# Patient Record
Sex: MALE | Race: WHITE
[De-identification: names, ages, dates, MRNs, and addresses within clinical notes are randomized per-mention and may not be internally consistent; named-entity substitution may affect disease eponyms.]

---

## 2017-01-09 NOTE — OPR
DATE OF OPERATION:  01/09/2017

 

 

PREOPERATIVE DIAGNOSIS:  Recurrent right inguinal hernia.

 

POSTOPERATIVE DIAGNOSIS:  Recurrent right inguinal hernia.

 

OPERATION PERFORMED:  Repair of recurrent right inguinal hernia.

 

ANESTHESIA:  General.

 

ANESTHESIOLOGIST:  Zeus Cornelius MD

 

SURGEON:  Steven Iqbal MD 

 

INDICATIONS FOR PROCEDURE:  The patient is a 71-year-old male who I had previously done a right ingu
inal herniorrhaphy on with mesh.  He did quite well for several months, but was lifting heavy object
s and experiencing an acute onset of "tear" and immediate bulge in his right groin.  He presented fo
r surgical evaluation was counseled as to the need for repair of this recurrent hernia, he consented
 and was scheduled for surgery.

 

DESCRIPTION OF PROCEDURE:  The patient was brought to the operating theater, placed under general an
esthesia.  The right groin was shaved, prepped and draped in the usual sterile fashion.  There is a 
visually obvious recurrent hernia just inferior to the previous surgical incisional scar.  Surgical 
incisional scar was reincised with a 15-blade scalpel.  Subcutaneous tissue was dissected with caute
ry.  Deep in the subcutaneous space, a well-circumscribed hernia sac was identified.  It was dissect
ed down to its base with the fascia.  It was opened up and found to contain omentum.  The sac and a 
portion of the omentum were transected with the LigaSure device and sent for permanent pathologic an
alysis.  Inspection of the defect revealed there was approximately 1.5 to 2 cm in diameter.  The dec
ision was made to repair it with a mesh plug.  The mesh plug was cut to size and placed within the d
efect in the fascia of the abdominal wall medially and to the inguinal ligament laterally were then 
used to perform a secure repair of the defect.  This was accomplished with 0 Prolene sutures in inte
rrupted fashion, taking great care to incorporate portions of the mesh plug within the suture repair
.  The patient tolerated the procedure well.  The wound was irrigated.  Minimal bleeding was control
led with cautery, and the skin was then reapproximated with skin staples.

 

ESTIMATED BLOOD LOSS:  20 mL.

 

COMPLICATIONS:  There were no complications.

 

DISPOSITION:  The patient was transported in stable condition to the recovery room.

 

 

Dictated By: STEVEN IQBAL MD

 

TL/TOY

DD:    01/09/2017 13:13:40

DT:    01/09/2017 14:40:09

Conf#: [id number]

DID#:  [id number]

## 2017-01-09 NOTE — RADRPT
PROCEDURE:   Chest Radiograph.

 

CLINICAL INDICATION:    Preop.  Hernia repair.

 

TECHNIQUE:   Single frontal chest radiograph. 

 

COMPARISON:  None available

 

FINDINGS:

 

Heart size is within normal limits.  Atherosclerotic calcifications are present.  There is mild biap
ical scarring.   No infiltrate or effusion is seen.    The bones are intact.  

 

IMPRESSION:

 

1.  No evidence of acute cardiopulmonary disease.    

 

RPTAT: KK

_____________________________________________ 

.Yury Johnson MD, MD           Date    Time 

Electronically viewed and signed by .Yury Johnson MD, MD on 01/09/2017 10:47 

 

D:  01/09/2017 10:47  T:  01/09/2017 10:47

.B/

## 2017-01-11 NOTE — RADRPT
Vent Rate: 76 bpm

RR Interval: 0 msec

MI Interval: 132 msec

QRS Duration: 96 msec

QT Interval: 388 msec

QTC Interval: 436 msec

P-R-T Axis: 80 - 51 - 67 degrees

 

 Normal sinus rhythm

 Normal ECG

 

Electronically Signed By: Gabriel Dinh

[id number]

## 2017-06-05 NOTE — RADRPT
PROCEDURE:   CHEST 1VW

 

CLINICAL INDICATION:  Preoperative

 

TECHNIQUE:   Single frontal view of the chest was obtained

 

COMPARISON:   01/19/2017 

 

FINDINGS:

 

The cardiac size is normal.

Aortic vascular calcifications are demonstrated.  

There is no pulmonary vascular congestion.

The lungs are clear. No consolidation, effusion, or pneumothorax. 

Mild degenerative changes of the visualized osseous structures are visualized.

 

IMPRESSION:

 

1. No acute cardiopulmonary process.

2. Atherosclerosis. 

 

RPTAT:PP

_____________________________________________ 

.Renan Shankar MD, MD           Date    Time 

Electronically viewed and signed by .Renan Shankar MD, MD on 06/05/2017 13:30 

 

D:  06/05/2017 13:30  T:  06/05/2017 13:30

.V/

## 2017-06-05 NOTE — OPR
DATE OF OPERATION:  06/05/2017

 

 

PREOPERATIVE DIAGNOSIS:  Symptomatic left inguinal hernia.

 

POSTOPERATIVE DIAGNOSIS:  Symptomatic left inguinal hernia.

 

OPERATION PERFORMED:  Left inguinal herniorrhaphy with mesh.

 

ANESTHESIOLOGIST:  CAMILA MURILLO MD.

 

SURGEON:  Steven Iqbal MD

 

ASSISTANT:  None.

 

INDICATIONS FOR PROCEDURE:  Patient is known to me and is a 71-year-old male whom I previously repai
red a right inguinal hernia on him.  He presented with increasing symptoms of left inguinal hernia. 
 He consented and was scheduled for surgery.

 

DESCRIPTION OF PROCEDURE:  The patient was brought to the operating theater, placed under general en
dotracheal tube anesthesia.  The left groin was shaved, prepped and draped in usual sterile fashion.
  Approximately 6 cm incision was made in the left groin transversing the approximate locations of t
he internal and external inguinal rings.  Subcutaneous tissue was dissected with cautery down to the
 aponeurosis of the external oblique.  The aponeurosis was incised in direction of the fibers throug
h the external ring.  With blunt dissection, medial and lateral flaps were developed.  The ilioingui
nal and iliohypogastric nerves were identified and kept out of harm's way.  The left spermatic cord 
was then elevated off the left pubic tubercle and a 1/4 inch Penrose drain was placed around it.  Th
ere was an obvious direct component of a hernia coming through the floor; however, there was also a 
moderately sized hernia sac consistent with an indirect component.  The sac was meticulously dissect
ed off the cord structures down to its base.  It was then opened.  There was no evidence of intraabd
ominal contents.  It was ligated at its base with 2-0 PDS suture, and the sac was then transected an
d sent for permanent pathologic analysis.  At this point, Dr. Iqbal made the decision to repair the 
direct component with onlay mesh patch and mesh was cut to size.  A slot was cut superiorly to facil
itate cord transgression and was then laid over the inguinal canal and sutured laterally with 2-0 Pr
olene sutures to the inguinal ligament and then medially also with 2-0 Prolene sutures to the abdomi
nal wall fascia.  Several interrupted sutures were also placed to reapproximate the slot which had b
een cut.  The wound was irrigated.  There was no evidence of bleeding.  The testicle was palpated an
d found to be in its normal anatomic location within the left scrotal sac.  The aponeurosis of the e
xternal oblique was then reapproximated with 3-0 Vicryl suture in running fashion.  Subcutaneous tis
kandy was irrigated with Betadine and skin incision was reapproximated with skin staples.  The patient
 tolerated the procedure well.  

 

ESTIMATED BLOOD LOSS:  20 mL.  

 

COMPLICATIONS:  None. 

 

The patient was transported in stable condition to the recovery room.

 

 

Dictated By: STEVEN MAYORGA/TOY

DD:    06/05/2017 16:20:44

DT:    06/05/2017 17:07:19

Conf#: [id number]

DID#:  [id number]

## 2017-06-07 NOTE — RADRPT
Vent Rate: 84 bpm

RR Interval: 0 msec

LA Interval: 124 msec

QRS Duration: 96 msec

QT Interval: 392 msec

QTC Interval: 463 msec

P-R-T Axis: 80 - 49 - 78 degrees

 

 Normal sinus rhythm

 Prolonged QT

 Abnormal ECG

 

Electronically Signed By: Jaquan Ely

[id number]

## 2018-03-14 ENCOUNTER — HOSPITAL ENCOUNTER (INPATIENT)
Dept: HOSPITAL 91 - MS2 | Age: 73
LOS: 8 days | Discharge: NURSING, REHAB OR LONG-TERM CARE | DRG: 64 | End: 2018-03-22
Payer: COMMERCIAL

## 2018-03-14 ENCOUNTER — HOSPITAL ENCOUNTER (INPATIENT)
Age: 73
LOS: 8 days | Discharge: NURSING, REHAB OR LONG-TERM CARE | DRG: 64 | End: 2018-03-22

## 2018-03-14 DIAGNOSIS — I10: ICD-10-CM

## 2018-03-14 DIAGNOSIS — E78.5: ICD-10-CM

## 2018-03-14 DIAGNOSIS — R13.10: ICD-10-CM

## 2018-03-14 DIAGNOSIS — N39.0: ICD-10-CM

## 2018-03-14 DIAGNOSIS — J69.0: ICD-10-CM

## 2018-03-14 DIAGNOSIS — Z72.0: ICD-10-CM

## 2018-03-14 DIAGNOSIS — N17.9: ICD-10-CM

## 2018-03-14 DIAGNOSIS — J96.01: ICD-10-CM

## 2018-03-14 DIAGNOSIS — R47.01: ICD-10-CM

## 2018-03-14 DIAGNOSIS — E86.0: ICD-10-CM

## 2018-03-14 DIAGNOSIS — G93.41: ICD-10-CM

## 2018-03-14 DIAGNOSIS — I69.354: ICD-10-CM

## 2018-03-14 DIAGNOSIS — I63.231: Primary | ICD-10-CM

## 2018-03-14 DIAGNOSIS — F32.9: ICD-10-CM

## 2018-03-14 DIAGNOSIS — E87.2: ICD-10-CM

## 2018-03-14 DIAGNOSIS — F01.50: ICD-10-CM

## 2018-03-14 LAB
ADD MAN DIFF?: NO
ADD UMIC: YES
ALANINE AMINOTRANSFERASE: 63 IU/L (ref 13–69)
ALBUMIN/GLOBULIN RATIO: 1.48
ALBUMIN: 4 G/DL (ref 3.3–4.9)
ALKALINE PHOSPHATASE: 74 IU/L (ref 42–121)
ANION GAP: 18 (ref 8–16)
ASPARTATE AMINO TRANSFERASE: 34 IU/L (ref 15–46)
BASOPHIL #: 0.1 10^3/UL (ref 0–0.1)
BASOPHILS %: 0.4 % (ref 0–2)
BILIRUBIN,DIRECT: 0 MG/DL (ref 0–0.2)
BILIRUBIN,TOTAL: 0.3 MG/DL (ref 0.2–1.3)
BLOOD UREA NITROGEN: 29 MG/DL (ref 7–20)
CALCIUM: 9.7 MG/DL (ref 8.4–10.2)
CARBON DIOXIDE: 23 MMOL/L (ref 21–31)
CHLORIDE: 101 MMOL/L (ref 97–110)
CREATININE: 1.29 MG/DL (ref 0.61–1.24)
EOSINOPHILS #: 0.1 10^3/UL (ref 0–0.5)
EOSINOPHILS %: 0.6 % (ref 0–7)
GLOBULIN: 2.7 G/DL (ref 1.3–3.2)
GLUCOSE: 181 MG/DL (ref 70–220)
HEMATOCRIT: 38.1 % (ref 42–52)
HEMOGLOBIN: 13.5 G/DL (ref 14–18)
INR: 0.92
LACTIC ACID: 1.7 MMOL/L (ref 0.5–2)
LACTIC ACID: 2.4 MMOL/L (ref 0.5–2)
LACTIC ACID: 2.8 MMOL/L (ref 0.5–2)
LIPASE: 134 U/L (ref 23–300)
LYMPHOCYTES #: 0.9 10^3/UL (ref 0.8–2.9)
LYMPHOCYTES %: 7.2 % (ref 15–51)
MEAN CORPUSCULAR HEMOGLOBIN: 31.3 PG (ref 29–33)
MEAN CORPUSCULAR HGB CONC: 35.4 G/DL (ref 32–37)
MEAN CORPUSCULAR VOLUME: 88.4 FL (ref 82–101)
MEAN PLATELET VOLUME: 8.6 FL (ref 7.4–10.4)
MONOCYTE #: 0.7 10^3/UL (ref 0.3–0.9)
MONOCYTES %: 5.5 % (ref 0–11)
NEUTROPHIL #: 10.2 10^3/UL (ref 1.6–7.5)
NEUTROPHILS %: 85.7 % (ref 39–77)
NUCLEATED RED BLOOD CELLS #: 0 10^3/UL (ref 0–0)
NUCLEATED RED BLOOD CELLS%: 0 /100WBC (ref 0–0)
PARTIAL THROMBOPLASTIN TIME: 26.2 SEC (ref 25–35)
PLATELET COUNT: 276 10^3/UL (ref 140–415)
POTASSIUM: 4.4 MMOL/L (ref 3.5–5.1)
PROTIME: 12.4 SEC (ref 11.9–14.9)
PT RATIO: 1
RED BLOOD COUNT: 4.31 10^6/UL (ref 4.7–6.1)
RED CELL DISTRIBUTION WIDTH: 12.5 % (ref 11.5–14.5)
SODIUM: 138 MMOL/L (ref 135–144)
TOTAL PROTEIN: 6.7 G/DL (ref 6.1–8.1)
TROPONIN-I: < 0.012 NG/ML (ref 0–0.12)
UR ASCORBIC ACID: NEGATIVE MG/DL
UR BACTERIA: (no result) /HPF
UR BILIRUBIN (DIP): NEGATIVE MG/DL
UR BLOOD (DIP): (no result) MG/DL
UR CLARITY: (no result)
UR COLOR: YELLOW
UR GLUCOSE (DIP): NEGATIVE MG/DL
UR KETONES (DIP): NEGATIVE MG/DL
UR LEUKOCYTE ESTERASE (DIP): NEGATIVE LEU/UL
UR MUCUS: (no result) /HPF
UR NITRITE (DIP): NEGATIVE MG/DL
UR PH (DIP): 5 (ref 5–9)
UR RBC: 4 /HPF (ref 0–5)
UR SPECIFIC GRAVITY (DIP): 1.02 (ref 1–1.03)
UR TOTAL PROTEIN (DIP): (no result) MG/DL
UR UROBILINOGEN (DIP): NEGATIVE MG/DL
UR WBC: 1 /HPF (ref 0–5)
WHITE BLOOD COUNT: 11.9 10^3/UL (ref 4.8–10.8)

## 2018-03-14 PROCEDURE — 97530 THERAPEUTIC ACTIVITIES: CPT

## 2018-03-14 PROCEDURE — 36415 COLL VENOUS BLD VENIPUNCTURE: CPT

## 2018-03-14 PROCEDURE — 87081 CULTURE SCREEN ONLY: CPT

## 2018-03-14 PROCEDURE — 99285 EMERGENCY DEPT VISIT HI MDM: CPT

## 2018-03-14 PROCEDURE — 87045 FECES CULTURE AEROBIC BACT: CPT

## 2018-03-14 PROCEDURE — 83690 ASSAY OF LIPASE: CPT

## 2018-03-14 PROCEDURE — 74230 X-RAY XM SWLNG FUNCJ C+: CPT

## 2018-03-14 PROCEDURE — 97110 THERAPEUTIC EXERCISES: CPT

## 2018-03-14 PROCEDURE — 80053 COMPREHEN METABOLIC PANEL: CPT

## 2018-03-14 PROCEDURE — 92611 MOTION FLUOROSCOPY/SWALLOW: CPT

## 2018-03-14 PROCEDURE — 81001 URINALYSIS AUTO W/SCOPE: CPT

## 2018-03-14 PROCEDURE — 82140 ASSAY OF AMMONIA: CPT

## 2018-03-14 PROCEDURE — 97163 PT EVAL HIGH COMPLEX 45 MIN: CPT

## 2018-03-14 PROCEDURE — 93005 ELECTROCARDIOGRAM TRACING: CPT

## 2018-03-14 PROCEDURE — 80048 BASIC METABOLIC PNL TOTAL CA: CPT

## 2018-03-14 PROCEDURE — 85610 PROTHROMBIN TIME: CPT

## 2018-03-14 PROCEDURE — 85730 THROMBOPLASTIN TIME PARTIAL: CPT

## 2018-03-14 PROCEDURE — 70450 CT HEAD/BRAIN W/O DYE: CPT

## 2018-03-14 PROCEDURE — 71045 X-RAY EXAM CHEST 1 VIEW: CPT

## 2018-03-14 PROCEDURE — 83735 ASSAY OF MAGNESIUM: CPT

## 2018-03-14 PROCEDURE — 96374 THER/PROPH/DIAG INJ IV PUSH: CPT

## 2018-03-14 PROCEDURE — 96375 TX/PRO/DX INJ NEW DRUG ADDON: CPT

## 2018-03-14 PROCEDURE — 84100 ASSAY OF PHOSPHORUS: CPT

## 2018-03-14 PROCEDURE — 70498 CT ANGIOGRAPHY NECK: CPT

## 2018-03-14 PROCEDURE — 87086 URINE CULTURE/COLONY COUNT: CPT

## 2018-03-14 PROCEDURE — 92610 EVALUATE SWALLOWING FUNCTION: CPT

## 2018-03-14 PROCEDURE — 85025 COMPLETE CBC W/AUTO DIFF WBC: CPT

## 2018-03-14 PROCEDURE — 83605 ASSAY OF LACTIC ACID: CPT

## 2018-03-14 PROCEDURE — 87040 BLOOD CULTURE FOR BACTERIA: CPT

## 2018-03-14 PROCEDURE — 93880 EXTRACRANIAL BILAT STUDY: CPT

## 2018-03-14 PROCEDURE — 70551 MRI BRAIN STEM W/O DYE: CPT

## 2018-03-14 PROCEDURE — 84484 ASSAY OF TROPONIN QUANT: CPT

## 2018-03-14 PROCEDURE — 92526 ORAL FUNCTION THERAPY: CPT

## 2018-03-14 RX ADMIN — THIAMINE HYDROCHLORIDE 1 ML: 100 INJECTION, SOLUTION INTRAMUSCULAR; INTRAVENOUS at 12:17

## 2018-03-14 RX ADMIN — FAMOTIDINE 1 MG: 10 INJECTION, SOLUTION INTRAVENOUS at 22:36

## 2018-03-14 RX ADMIN — VANCOMYCIN HYDROCHLORIDE 1 MLS/HR: 1 INJECTION, POWDER, LYOPHILIZED, FOR SOLUTION INTRAVENOUS at 13:22

## 2018-03-14 RX ADMIN — THIAMINE HYDROCHLORIDE 1 MLS/HR: 100 INJECTION, SOLUTION INTRAMUSCULAR; INTRAVENOUS at 22:36

## 2018-03-14 RX ADMIN — LISINOPRIL 1 MG: 20 TABLET ORAL at 22:37

## 2018-03-14 RX ADMIN — ATORVASTATIN CALCIUM 1 MG: 40 TABLET, FILM COATED ORAL at 22:36

## 2018-03-14 RX ADMIN — CEFEPIME HYDROCHLORIDE 1 MLS/HR: 2 INJECTION, POWDER, FOR SOLUTION INTRAVENOUS at 13:03

## 2018-03-14 RX ADMIN — PYRIDOXINE HYDROCHLORIDE 1 MLS/HR: 100 INJECTION, SOLUTION INTRAMUSCULAR; INTRAVENOUS at 12:16

## 2018-03-15 LAB
ADD MAN DIFF?: NO
ANION GAP: 13 (ref 8–16)
BASOPHIL #: 0.1 10^3/UL (ref 0–0.1)
BASOPHILS %: 0.8 % (ref 0–2)
BLOOD UREA NITROGEN: 18 MG/DL (ref 7–20)
CALCIUM: 9.4 MG/DL (ref 8.4–10.2)
CARBON DIOXIDE: 29 MMOL/L (ref 21–31)
CHLORIDE: 107 MMOL/L (ref 97–110)
CREATININE: 1.2 MG/DL (ref 0.61–1.24)
EOSINOPHILS #: 0.1 10^3/UL (ref 0–0.5)
EOSINOPHILS %: 1.7 % (ref 0–7)
GLUCOSE: 76 MG/DL (ref 70–220)
HEMATOCRIT: 33.5 % (ref 42–52)
HEMOGLOBIN: 11.8 G/DL (ref 14–18)
LACTIC ACID: 0.9 MMOL/L (ref 0.5–2)
LYMPHOCYTES #: 1.3 10^3/UL (ref 0.8–2.9)
LYMPHOCYTES %: 15.4 % (ref 15–51)
MAGNESIUM: 1.7 MG/DL (ref 1.7–2.5)
MEAN CORPUSCULAR HEMOGLOBIN: 31.2 PG (ref 29–33)
MEAN CORPUSCULAR HGB CONC: 35.2 G/DL (ref 32–37)
MEAN CORPUSCULAR VOLUME: 88.6 FL (ref 82–101)
MEAN PLATELET VOLUME: 8.8 FL (ref 7.4–10.4)
MONOCYTE #: 0.8 10^3/UL (ref 0.3–0.9)
MONOCYTES %: 9.3 % (ref 0–11)
NEUTROPHIL #: 6 10^3/UL (ref 1.6–7.5)
NEUTROPHILS %: 72.4 % (ref 39–77)
NUCLEATED RED BLOOD CELLS #: 0 10^3/UL (ref 0–0)
NUCLEATED RED BLOOD CELLS%: 0 /100WBC (ref 0–0)
PHOSPHORUS: 3 MG/DL (ref 2.5–4.9)
PLATELET COUNT: 249 10^3/UL (ref 140–415)
POTASSIUM: 4.6 MMOL/L (ref 3.5–5.1)
RED BLOOD COUNT: 3.78 10^6/UL (ref 4.7–6.1)
RED CELL DISTRIBUTION WIDTH: 12.7 % (ref 11.5–14.5)
SODIUM: 144 MMOL/L (ref 135–144)
WHITE BLOOD COUNT: 8.3 10^3/UL (ref 4.8–10.8)

## 2018-03-15 RX ADMIN — FAMOTIDINE 1 MG: 10 INJECTION, SOLUTION INTRAVENOUS at 21:17

## 2018-03-15 RX ADMIN — FAMOTIDINE 1 MG: 10 INJECTION, SOLUTION INTRAVENOUS at 08:57

## 2018-03-15 RX ADMIN — ATORVASTATIN CALCIUM 1 MG: 40 TABLET, FILM COATED ORAL at 21:17

## 2018-03-15 RX ADMIN — THIAMINE HYDROCHLORIDE 1 MLS/HR: 100 INJECTION, SOLUTION INTRAMUSCULAR; INTRAVENOUS at 13:34

## 2018-03-15 RX ADMIN — SODIUM CHLORIDE, PRESERVATIVE FREE 1 ML: 5 INJECTION INTRAVENOUS at 21:17

## 2018-03-15 RX ADMIN — ASPIRIN 325 MG ORAL TABLET 1 MG: 325 PILL ORAL at 08:57

## 2018-03-15 RX ADMIN — CLOPIDOGREL 1 MG: 75 TABLET, FILM COATED ORAL at 15:35

## 2018-03-15 RX ADMIN — THERA TABS 1 TAB: TAB at 09:00

## 2018-03-15 RX ADMIN — THIAMINE HYDROCHLORIDE 1 MLS/HR: 100 INJECTION, SOLUTION INTRAMUSCULAR; INTRAVENOUS at 15:48

## 2018-03-15 RX ADMIN — AMLODIPINE BESYLATE 1 MG: 10 TABLET ORAL at 09:00

## 2018-03-15 RX ADMIN — SERTRALINE HYDROCHLORIDE 1 MG: 50 TABLET ORAL at 09:00

## 2018-03-15 RX ADMIN — CEFEPIME HYDROCHLORIDE 1 MLS/HR: 2 INJECTION, POWDER, FOR SOLUTION INTRAVENOUS at 13:31

## 2018-03-15 RX ADMIN — THIAMINE HYDROCHLORIDE 1 MLS/HR: 100 INJECTION, SOLUTION INTRAMUSCULAR; INTRAVENOUS at 03:18

## 2018-03-15 RX ADMIN — LISINOPRIL 1 MG: 20 TABLET ORAL at 21:17

## 2018-03-16 LAB
ADD MAN DIFF?: NO
ANION GAP: 15 (ref 8–16)
BASOPHIL #: 0.1 10^3/UL (ref 0–0.1)
BASOPHILS %: 0.7 % (ref 0–2)
BLOOD UREA NITROGEN: 13 MG/DL (ref 7–20)
CALCIUM: 10.2 MG/DL (ref 8.4–10.2)
CARBON DIOXIDE: 28 MMOL/L (ref 21–31)
CHLORIDE: 106 MMOL/L (ref 97–110)
CREATININE: 1.18 MG/DL (ref 0.61–1.24)
EOSINOPHILS #: 0.1 10^3/UL (ref 0–0.5)
EOSINOPHILS %: 1.5 % (ref 0–7)
GLUCOSE: 86 MG/DL (ref 70–220)
HEMATOCRIT: 36.3 % (ref 42–52)
HEMOGLOBIN: 13 G/DL (ref 14–18)
LYMPHOCYTES #: 1.3 10^3/UL (ref 0.8–2.9)
LYMPHOCYTES %: 13.3 % (ref 15–51)
MAGNESIUM: 1.5 MG/DL (ref 1.7–2.5)
MEAN CORPUSCULAR HEMOGLOBIN: 31.5 PG (ref 29–33)
MEAN CORPUSCULAR HGB CONC: 35.8 G/DL (ref 32–37)
MEAN CORPUSCULAR VOLUME: 87.9 FL (ref 82–101)
MEAN PLATELET VOLUME: 8.5 FL (ref 7.4–10.4)
MONOCYTE #: 0.8 10^3/UL (ref 0.3–0.9)
MONOCYTES %: 8.1 % (ref 0–11)
NEUTROPHIL #: 7.2 10^3/UL (ref 1.6–7.5)
NEUTROPHILS %: 76 % (ref 39–77)
NUCLEATED RED BLOOD CELLS #: 0 10^3/UL (ref 0–0)
NUCLEATED RED BLOOD CELLS%: 0 /100WBC (ref 0–0)
PHOSPHORUS: 2.9 MG/DL (ref 2.5–4.9)
PLATELET COUNT: 270 10^3/UL (ref 140–415)
POTASSIUM: 5.3 MMOL/L (ref 3.5–5.1)
RED BLOOD COUNT: 4.13 10^6/UL (ref 4.7–6.1)
RED CELL DISTRIBUTION WIDTH: 12.2 % (ref 11.5–14.5)
SODIUM: 144 MMOL/L (ref 135–144)
WHITE BLOOD COUNT: 9.4 10^3/UL (ref 4.8–10.8)

## 2018-03-16 RX ADMIN — ATORVASTATIN CALCIUM 1 MG: 40 TABLET, FILM COATED ORAL at 20:55

## 2018-03-16 RX ADMIN — CLOPIDOGREL 1 MG: 75 TABLET, FILM COATED ORAL at 09:04

## 2018-03-16 RX ADMIN — AMLODIPINE BESYLATE 1 MG: 10 TABLET ORAL at 09:04

## 2018-03-16 RX ADMIN — CEFEPIME HYDROCHLORIDE 1 MLS/HR: 2 INJECTION, POWDER, FOR SOLUTION INTRAVENOUS at 14:17

## 2018-03-16 RX ADMIN — LISINOPRIL 1 MG: 20 TABLET ORAL at 20:55

## 2018-03-16 RX ADMIN — THIAMINE HYDROCHLORIDE 1 MLS/HR: 100 INJECTION, SOLUTION INTRAMUSCULAR; INTRAVENOUS at 04:34

## 2018-03-16 RX ADMIN — FAMOTIDINE 1 MG: 10 INJECTION, SOLUTION INTRAVENOUS at 09:04

## 2018-03-16 RX ADMIN — VANCOMYCIN HYDROCHLORIDE 1 MLS/HR: 500 INJECTION, POWDER, LYOPHILIZED, FOR SOLUTION INTRAVENOUS at 00:44

## 2018-03-16 RX ADMIN — VANCOMYCIN HYDROCHLORIDE 1 MLS/HR: 500 INJECTION, POWDER, LYOPHILIZED, FOR SOLUTION INTRAVENOUS at 12:11

## 2018-03-16 RX ADMIN — SERTRALINE HYDROCHLORIDE 1 MG: 50 TABLET ORAL at 09:05

## 2018-03-16 RX ADMIN — THIAMINE HYDROCHLORIDE 1 MLS/HR: 100 INJECTION, SOLUTION INTRAMUSCULAR; INTRAVENOUS at 16:48

## 2018-03-16 RX ADMIN — FAMOTIDINE 1 MG: 10 INJECTION, SOLUTION INTRAVENOUS at 20:54

## 2018-03-16 RX ADMIN — ASPIRIN 325 MG ORAL TABLET 1 MG: 325 PILL ORAL at 09:04

## 2018-03-16 RX ADMIN — THERA TABS 1 TAB: TAB at 09:04

## 2018-03-16 RX ADMIN — THIAMINE HYDROCHLORIDE 1 MLS/HR: 100 INJECTION, SOLUTION INTRAMUSCULAR; INTRAVENOUS at 20:22

## 2018-03-17 LAB
AMMONIA: < 9 UMOL/L (ref 9–30)
ANION GAP: 15 (ref 8–16)
BLOOD UREA NITROGEN: 13 MG/DL (ref 7–20)
CALCIUM: 9.3 MG/DL (ref 8.4–10.2)
CARBON DIOXIDE: 24 MMOL/L (ref 21–31)
CHLORIDE: 105 MMOL/L (ref 97–110)
CREATININE: 1.06 MG/DL (ref 0.61–1.24)
GLUCOSE: 88 MG/DL (ref 70–220)
LACTIC ACID: 0.9 MMOL/L (ref 0.5–2)
POTASSIUM: 4 MMOL/L (ref 3.5–5.1)
SODIUM: 140 MMOL/L (ref 135–144)

## 2018-03-17 RX ADMIN — THIAMINE HYDROCHLORIDE 1 MLS/HR: 100 INJECTION, SOLUTION INTRAMUSCULAR; INTRAVENOUS at 05:18

## 2018-03-17 RX ADMIN — THIAMINE HYDROCHLORIDE 1 MLS/HR: 100 INJECTION, SOLUTION INTRAMUSCULAR; INTRAVENOUS at 12:45

## 2018-03-17 RX ADMIN — ASPIRIN 325 MG ORAL TABLET 1 MG: 325 PILL ORAL at 09:33

## 2018-03-17 RX ADMIN — CEFEPIME HYDROCHLORIDE 1 MLS/HR: 2 INJECTION, POWDER, FOR SOLUTION INTRAVENOUS at 12:45

## 2018-03-17 RX ADMIN — CLOPIDOGREL 1 MG: 75 TABLET, FILM COATED ORAL at 09:33

## 2018-03-17 RX ADMIN — THIAMINE HYDROCHLORIDE 1 MLS/HR: 100 INJECTION, SOLUTION INTRAMUSCULAR; INTRAVENOUS at 17:43

## 2018-03-17 RX ADMIN — CLINDAMYCIN IN 5 PERCENT DEXTROSE 1 MLS/HR: 12 INJECTION, SOLUTION INTRAVENOUS at 13:25

## 2018-03-17 RX ADMIN — FAMOTIDINE 1 MG: 10 INJECTION, SOLUTION INTRAVENOUS at 21:08

## 2018-03-17 RX ADMIN — SERTRALINE HYDROCHLORIDE 1 MG: 50 TABLET ORAL at 09:34

## 2018-03-17 RX ADMIN — VANCOMYCIN HYDROCHLORIDE 1 MLS/HR: 500 INJECTION, POWDER, LYOPHILIZED, FOR SOLUTION INTRAVENOUS at 01:21

## 2018-03-17 RX ADMIN — ATORVASTATIN CALCIUM 1 MG: 40 TABLET, FILM COATED ORAL at 21:08

## 2018-03-17 RX ADMIN — LISINOPRIL 1 MG: 20 TABLET ORAL at 21:07

## 2018-03-17 RX ADMIN — CLINDAMYCIN IN 5 PERCENT DEXTROSE 1 MLS/HR: 12 INJECTION, SOLUTION INTRAVENOUS at 21:11

## 2018-03-17 RX ADMIN — AMLODIPINE BESYLATE 1 MG: 10 TABLET ORAL at 09:34

## 2018-03-17 RX ADMIN — THERA TABS 1 TAB: TAB at 09:33

## 2018-03-17 RX ADMIN — FAMOTIDINE 1 MG: 10 INJECTION, SOLUTION INTRAVENOUS at 09:34

## 2018-03-18 LAB
ADD MAN DIFF?: NO
ALANINE AMINOTRANSFERASE: 33 IU/L (ref 13–69)
ALBUMIN/GLOBULIN RATIO: 1.38
ALBUMIN: 3.6 G/DL (ref 3.3–4.9)
ALKALINE PHOSPHATASE: 61 IU/L (ref 42–121)
ANION GAP: 16 (ref 8–16)
ASPARTATE AMINO TRANSFERASE: 21 IU/L (ref 15–46)
BASOPHIL #: 0.1 10^3/UL (ref 0–0.1)
BASOPHILS %: 0.7 % (ref 0–2)
BILIRUBIN,DIRECT: 0 MG/DL (ref 0–0.2)
BILIRUBIN,TOTAL: 0.5 MG/DL (ref 0.2–1.3)
BLOOD UREA NITROGEN: 13 MG/DL (ref 7–20)
CALCIUM: 9.1 MG/DL (ref 8.4–10.2)
CARBON DIOXIDE: 25 MMOL/L (ref 21–31)
CHLORIDE: 105 MMOL/L (ref 97–110)
CREATININE: 1.09 MG/DL (ref 0.61–1.24)
EOSINOPHILS #: 0.2 10^3/UL (ref 0–0.5)
EOSINOPHILS %: 2 % (ref 0–7)
GLOBULIN: 2.6 G/DL (ref 1.3–3.2)
GLUCOSE: 68 MG/DL (ref 70–220)
HEMATOCRIT: 31.2 % (ref 42–52)
HEMOGLOBIN: 11.3 G/DL (ref 14–18)
LYMPHOCYTES #: 1.1 10^3/UL (ref 0.8–2.9)
LYMPHOCYTES %: 14.8 % (ref 15–51)
MAGNESIUM: 1.4 MG/DL (ref 1.7–2.5)
MEAN CORPUSCULAR HEMOGLOBIN: 31.3 PG (ref 29–33)
MEAN CORPUSCULAR HGB CONC: 36.2 G/DL (ref 32–37)
MEAN CORPUSCULAR VOLUME: 86.4 FL (ref 82–101)
MEAN PLATELET VOLUME: 8.9 FL (ref 7.4–10.4)
MONOCYTE #: 0.6 10^3/UL (ref 0.3–0.9)
MONOCYTES %: 8.1 % (ref 0–11)
NEUTROPHIL #: 5.4 10^3/UL (ref 1.6–7.5)
NEUTROPHILS %: 74.1 % (ref 39–77)
NUCLEATED RED BLOOD CELLS #: 0 10^3/UL (ref 0–0)
NUCLEATED RED BLOOD CELLS%: 0 /100WBC (ref 0–0)
PLATELET COUNT: 255 10^3/UL (ref 140–415)
POTASSIUM: 3.4 MMOL/L (ref 3.5–5.1)
RED BLOOD COUNT: 3.61 10^6/UL (ref 4.7–6.1)
RED CELL DISTRIBUTION WIDTH: 12.1 % (ref 11.5–14.5)
SODIUM: 143 MMOL/L (ref 135–144)
TOTAL PROTEIN: 6.2 G/DL (ref 6.1–8.1)
WHITE BLOOD COUNT: 7.3 10^3/UL (ref 4.8–10.8)

## 2018-03-18 RX ADMIN — ATORVASTATIN CALCIUM 1 MG: 40 TABLET, FILM COATED ORAL at 21:00

## 2018-03-18 RX ADMIN — CEFEPIME HYDROCHLORIDE 1 MLS/HR: 2 INJECTION, POWDER, FOR SOLUTION INTRAVENOUS at 14:25

## 2018-03-18 RX ADMIN — AMLODIPINE BESYLATE 1 MG: 10 TABLET ORAL at 09:00

## 2018-03-18 RX ADMIN — SERTRALINE HYDROCHLORIDE 1 MG: 50 TABLET ORAL at 09:00

## 2018-03-18 RX ADMIN — CLINDAMYCIN IN 5 PERCENT DEXTROSE 1 MLS/HR: 12 INJECTION, SOLUTION INTRAVENOUS at 15:23

## 2018-03-18 RX ADMIN — POTASSIUM CHLORIDE 1 MLS/HR: 200 INJECTION, SOLUTION INTRAVENOUS at 21:29

## 2018-03-18 RX ADMIN — CLINDAMYCIN IN 5 PERCENT DEXTROSE 1 MLS/HR: 12 INJECTION, SOLUTION INTRAVENOUS at 05:43

## 2018-03-18 RX ADMIN — FAMOTIDINE 1 MG: 10 INJECTION, SOLUTION INTRAVENOUS at 09:06

## 2018-03-18 RX ADMIN — THERA TABS 1 TAB: TAB at 09:00

## 2018-03-18 RX ADMIN — CLOPIDOGREL 1 MG: 75 TABLET, FILM COATED ORAL at 09:00

## 2018-03-18 RX ADMIN — LISINOPRIL 1 MG: 20 TABLET ORAL at 21:00

## 2018-03-18 RX ADMIN — POTASSIUM CHLORIDE 1 MLS/HR: 200 INJECTION, SOLUTION INTRAVENOUS at 23:49

## 2018-03-18 RX ADMIN — ASPIRIN 1 MG: 300 SUPPOSITORY RECTAL at 11:15

## 2018-03-18 RX ADMIN — MAGNESIUM SULFATE HEPTAHYDRATE 1 MLS/HR: 40 INJECTION, SOLUTION INTRAVENOUS at 17:55

## 2018-03-18 RX ADMIN — ASPIRIN 325 MG ORAL TABLET 1 MG: 325 PILL ORAL at 09:00

## 2018-03-18 RX ADMIN — CLINDAMYCIN IN 5 PERCENT DEXTROSE 1 MLS/HR: 12 INJECTION, SOLUTION INTRAVENOUS at 22:38

## 2018-03-18 RX ADMIN — THIAMINE HYDROCHLORIDE 1 MLS/HR: 100 INJECTION, SOLUTION INTRAMUSCULAR; INTRAVENOUS at 04:18

## 2018-03-18 RX ADMIN — THIAMINE HYDROCHLORIDE 1 MLS/HR: 100 INJECTION, SOLUTION INTRAMUSCULAR; INTRAVENOUS at 18:48

## 2018-03-18 RX ADMIN — FAMOTIDINE 1 MG: 10 INJECTION, SOLUTION INTRAVENOUS at 21:28

## 2018-03-18 RX ADMIN — THIAMINE HYDROCHLORIDE 1 MLS/HR: 100 INJECTION, SOLUTION INTRAMUSCULAR; INTRAVENOUS at 20:04

## 2018-03-19 LAB
ABNORMAL IP MESSAGE: 1
ADD MAN DIFF?: NO
ALANINE AMINOTRANSFERASE: 35 IU/L (ref 13–69)
ALBUMIN/GLOBULIN RATIO: 1.08
ALBUMIN: 3.9 G/DL (ref 3.3–4.9)
ALKALINE PHOSPHATASE: 78 IU/L (ref 42–121)
ANION GAP: 18 (ref 8–16)
ANISOCYTOSIS: (no result) (ref 0–0)
ASPARTATE AMINO TRANSFERASE: 27 IU/L (ref 15–46)
BAND NEUTROPHILS #M: 0.4 10^3/UL (ref 0–0.6)
BAND NEUTROPHILS % (M): 5 % (ref 0–4)
BASOPHIL #: 0.1 10^3/UL (ref 0–0.1)
BASOPHILS %: 0.6 % (ref 0–2)
BILIRUBIN,DIRECT: 0 MG/DL (ref 0–0.2)
BILIRUBIN,TOTAL: 0.6 MG/DL (ref 0.2–1.3)
BLOOD UREA NITROGEN: 15 MG/DL (ref 7–20)
CALCIUM: 9.1 MG/DL (ref 8.4–10.2)
CARBON DIOXIDE: 24 MMOL/L (ref 21–31)
CHLORIDE: 104 MMOL/L (ref 97–110)
CREATININE: 1.14 MG/DL (ref 0.61–1.24)
EOSINOPHILS #: 0.5 10^3/UL (ref 0–0.5)
EOSINOPHILS %: 6.2 % (ref 0–7)
GIANT THROMBO% (M): 1 % (ref 0–0)
GLOBULIN: 3.6 G/DL (ref 1.3–3.2)
GLUCOSE: 59 MG/DL (ref 70–220)
HEMATOCRIT: 34.1 % (ref 42–52)
HEMOGLOBIN: 12.3 G/DL (ref 14–18)
LYMPHOCYTES #: 0.9 10^3/UL (ref 0.8–2.9)
LYMPHOCYTES #M: 0.4 10^3/UL (ref 0.8–2.9)
LYMPHOCYTES % (M): 5 % (ref 15–51)
LYMPHOCYTES %: 10.6 % (ref 15–51)
MAGNESIUM: 2.3 MG/DL (ref 1.7–2.5)
MEAN CORPUSCULAR HEMOGLOBIN: 31.2 PG (ref 29–33)
MEAN CORPUSCULAR HGB CONC: 36.1 G/DL (ref 32–37)
MEAN CORPUSCULAR VOLUME: 86.5 FL (ref 82–101)
MEAN PLATELET VOLUME: 8.4 FL (ref 7.4–10.4)
MICROCYTOSIS: (no result) (ref 0–0)
MONOCYTE #: 0.6 10^3/UL (ref 0.3–0.9)
MONOCYTE #M: 0.2 10^3/UL (ref 0.3–0.9)
MONOCYTES % (M): 3 % (ref 0–11)
MONOCYTES %: 6.9 % (ref 0–11)
NEUTROPHIL #: 6.2 10^3/UL (ref 1.6–7.5)
NEUTROPHILS %: 75.3 % (ref 39–77)
NUCLEATED RED BLOOD CELLS #: 0 10^3/UL (ref 0–0)
NUCLEATED RED BLOOD CELLS%: 0 /100WBC (ref 0–0)
PLATELET COUNT: 276 10^3/UL (ref 140–415)
PLATELET ESTIMATE: NORMAL
POIKILOCYTOSIS: (no result) (ref 0–0)
POLYCHROMASIA: (no result) (ref 0–0)
POSITIVE DIFF: (no result)
POTASSIUM: 4.3 MMOL/L (ref 3.5–5.1)
RED BLOOD COUNT: 3.94 10^6/UL (ref 4.7–6.1)
RED CELL DISTRIBUTION WIDTH: 12.5 % (ref 11.5–14.5)
SEG NEUT #M: 7.2 10^3/UL (ref 1.6–7.5)
SEGMENTED NEUTROPHILS (M) %: 87 % (ref 39–77)
SMUDGE%M: 1 % (ref 0–0)
SODIUM: 142 MMOL/L (ref 135–144)
TOTAL PROTEIN: 7.5 G/DL (ref 6.1–8.1)
WHITE BLOOD COUNT: 8.2 10^3/UL (ref 4.8–10.8)

## 2018-03-19 RX ADMIN — FAMOTIDINE 1 MG: 10 INJECTION, SOLUTION INTRAVENOUS at 08:39

## 2018-03-19 RX ADMIN — FAMOTIDINE 1 MG: 10 INJECTION, SOLUTION INTRAVENOUS at 22:26

## 2018-03-19 RX ADMIN — ATORVASTATIN CALCIUM 1 MG: 40 TABLET, FILM COATED ORAL at 22:26

## 2018-03-19 RX ADMIN — CLINDAMYCIN IN 5 PERCENT DEXTROSE 1 MLS/HR: 12 INJECTION, SOLUTION INTRAVENOUS at 05:53

## 2018-03-19 RX ADMIN — CEFEPIME HYDROCHLORIDE 1 MLS/HR: 2 INJECTION, POWDER, FOR SOLUTION INTRAVENOUS at 13:20

## 2018-03-19 RX ADMIN — CLINDAMYCIN IN 5 PERCENT DEXTROSE 1 MLS/HR: 12 INJECTION, SOLUTION INTRAVENOUS at 22:23

## 2018-03-19 RX ADMIN — LISINOPRIL 1 MG: 20 TABLET ORAL at 22:26

## 2018-03-19 RX ADMIN — ASPIRIN 325 MG ORAL TABLET 1 MG: 325 PILL ORAL at 08:39

## 2018-03-19 RX ADMIN — AMLODIPINE BESYLATE 1 MG: 10 TABLET ORAL at 08:23

## 2018-03-19 RX ADMIN — CLOPIDOGREL 1 MG: 75 TABLET, FILM COATED ORAL at 08:23

## 2018-03-19 RX ADMIN — THIAMINE HYDROCHLORIDE 1 MLS/HR: 100 INJECTION, SOLUTION INTRAMUSCULAR; INTRAVENOUS at 15:26

## 2018-03-19 RX ADMIN — CLINDAMYCIN IN 5 PERCENT DEXTROSE 1 MLS/HR: 12 INJECTION, SOLUTION INTRAVENOUS at 14:13

## 2018-03-19 RX ADMIN — ASPIRIN 1 MG: 300 SUPPOSITORY RECTAL at 09:57

## 2018-03-19 RX ADMIN — SERTRALINE HYDROCHLORIDE 1 MG: 50 TABLET ORAL at 08:24

## 2018-03-19 RX ADMIN — BARIUM SULFATE 1 ML: 980 POWDER, FOR SUSPENSION ORAL at 11:32

## 2018-03-19 RX ADMIN — THERA TABS 1 TAB: TAB at 08:24

## 2018-03-19 RX ADMIN — HEPARIN SODIUM 1 UNIT: 5000 INJECTION, SOLUTION INTRAVENOUS; SUBCUTANEOUS at 22:27

## 2018-03-20 LAB
ADD MAN DIFF?: NO
BASOPHIL #: 0.1 10^3/UL (ref 0–0.1)
BASOPHILS %: 0.7 % (ref 0–2)
EOSINOPHILS #: 0.2 10^3/UL (ref 0–0.5)
EOSINOPHILS %: 2 % (ref 0–7)
HEMATOCRIT: 33.9 % (ref 42–52)
HEMOGLOBIN: 12.2 G/DL (ref 14–18)
LYMPHOCYTES #: 1.2 10^3/UL (ref 0.8–2.9)
LYMPHOCYTES %: 16 % (ref 15–51)
MEAN CORPUSCULAR HEMOGLOBIN: 31.3 PG (ref 29–33)
MEAN CORPUSCULAR HGB CONC: 36 G/DL (ref 32–37)
MEAN CORPUSCULAR VOLUME: 86.9 FL (ref 82–101)
MEAN PLATELET VOLUME: 9.1 FL (ref 7.4–10.4)
MONOCYTE #: 0.7 10^3/UL (ref 0.3–0.9)
MONOCYTES %: 9.3 % (ref 0–11)
NEUTROPHIL #: 5.3 10^3/UL (ref 1.6–7.5)
NEUTROPHILS %: 71.6 % (ref 39–77)
NUCLEATED RED BLOOD CELLS #: 0 10^3/UL (ref 0–0)
NUCLEATED RED BLOOD CELLS%: 0 /100WBC (ref 0–0)
PLATELET COUNT: 243 10^3/UL (ref 140–415)
POSITIVE DIFF: (no result)
RED BLOOD COUNT: 3.9 10^6/UL (ref 4.7–6.1)
RED CELL DISTRIBUTION WIDTH: 12.6 % (ref 11.5–14.5)
WHITE BLOOD COUNT: 7.4 10^3/UL (ref 4.8–10.8)

## 2018-03-20 RX ADMIN — FAMOTIDINE 1 MG: 10 INJECTION, SOLUTION INTRAVENOUS at 20:13

## 2018-03-20 RX ADMIN — AMLODIPINE BESYLATE 1 MG: 10 TABLET ORAL at 09:00

## 2018-03-20 RX ADMIN — CLINDAMYCIN IN 5 PERCENT DEXTROSE 1 MLS/HR: 12 INJECTION, SOLUTION INTRAVENOUS at 07:02

## 2018-03-20 RX ADMIN — THIAMINE HYDROCHLORIDE 1 MLS/HR: 100 INJECTION, SOLUTION INTRAMUSCULAR; INTRAVENOUS at 08:18

## 2018-03-20 RX ADMIN — LISINOPRIL 1 MG: 20 TABLET ORAL at 20:17

## 2018-03-20 RX ADMIN — CLINDAMYCIN IN 5 PERCENT DEXTROSE 1 MLS/HR: 12 INJECTION, SOLUTION INTRAVENOUS at 17:03

## 2018-03-20 RX ADMIN — ATORVASTATIN CALCIUM 1 MG: 40 TABLET, FILM COATED ORAL at 20:17

## 2018-03-20 RX ADMIN — HEPARIN SODIUM 1 UNIT: 5000 INJECTION, SOLUTION INTRAVENOUS; SUBCUTANEOUS at 23:14

## 2018-03-20 RX ADMIN — CEFAZOLIN 1 MLS/HR: 1 INJECTION, POWDER, FOR SOLUTION INTRAMUSCULAR; INTRAVENOUS at 18:23

## 2018-03-20 RX ADMIN — SERTRALINE HYDROCHLORIDE 1 MG: 50 TABLET ORAL at 09:00

## 2018-03-20 RX ADMIN — CLINDAMYCIN IN 5 PERCENT DEXTROSE 1 MLS/HR: 12 INJECTION, SOLUTION INTRAVENOUS at 23:14

## 2018-03-20 RX ADMIN — CLINDAMYCIN IN 5 PERCENT DEXTROSE 1 MLS/HR: 12 INJECTION, SOLUTION INTRAVENOUS at 14:46

## 2018-03-20 RX ADMIN — CLOPIDOGREL 1 MG: 75 TABLET, FILM COATED ORAL at 09:00

## 2018-03-20 RX ADMIN — CLINDAMYCIN IN 5 PERCENT DEXTROSE 1 MLS/HR: 12 INJECTION, SOLUTION INTRAVENOUS at 14:00

## 2018-03-20 RX ADMIN — FAMOTIDINE 1 MG: 10 INJECTION, SOLUTION INTRAVENOUS at 10:14

## 2018-03-20 RX ADMIN — HEPARIN SODIUM 1 UNIT: 5000 INJECTION, SOLUTION INTRAVENOUS; SUBCUTANEOUS at 07:04

## 2018-03-20 RX ADMIN — ASPIRIN 325 MG ORAL TABLET 1 MG: 325 PILL ORAL at 09:00

## 2018-03-20 RX ADMIN — THERA TABS 1 TAB: TAB at 09:00

## 2018-03-20 RX ADMIN — ASCORBIC ACID, VITAMIN A PALMITATE, CHOLECALCIFEROL, THIAMINE HYDROCHLORIDE, RIBOFLAVIN-5 PHOSPHATE SODIUM, PYRIDOXINE HYDROCHLORIDE, NIACINAMIDE, DEXPANTHENOL, ALPHA-TOCOPHEROL ACETATE, VITAMIN K1, FOLIC ACID, BIOTIN, CYANOCOBALAMIN 1 MLS/HR: 200; 3300; 200; 6; 3.6; 6; 40; 15; 10; 150; 600; 60; 5 INJECTION, SOLUTION INTRAVENOUS at 15:19

## 2018-03-20 RX ADMIN — THIAMINE HYDROCHLORIDE 1 MLS/HR: 100 INJECTION, SOLUTION INTRAMUSCULAR; INTRAVENOUS at 11:03

## 2018-03-20 RX ADMIN — HYDRALAZINE HYDROCHLORIDE 1 MG: 20 INJECTION INTRAMUSCULAR; INTRAVENOUS at 08:23

## 2018-03-20 RX ADMIN — HEPARIN SODIUM 1 UNIT: 5000 INJECTION, SOLUTION INTRAVENOUS; SUBCUTANEOUS at 14:47

## 2018-03-21 LAB
ANION GAP: 17 (ref 8–16)
BLOOD UREA NITROGEN: 11 MG/DL (ref 7–20)
CALCIUM: 9.3 MG/DL (ref 8.4–10.2)
CARBON DIOXIDE: 27 MMOL/L (ref 21–31)
CHLORIDE: 99 MMOL/L (ref 97–110)
CREATININE: 1.12 MG/DL (ref 0.61–1.24)
GLUCOSE: 155 MG/DL (ref 70–220)
POTASSIUM: 3.4 MMOL/L (ref 3.5–5.1)
SODIUM: 140 MMOL/L (ref 135–144)

## 2018-03-21 PROCEDURE — 0DH63UZ INSERTION OF FEEDING DEVICE INTO STOMACH, PERCUTANEOUS APPROACH: ICD-10-PCS

## 2018-03-21 RX ADMIN — ASCORBIC ACID, VITAMIN A PALMITATE, CHOLECALCIFEROL, THIAMINE HYDROCHLORIDE, RIBOFLAVIN-5 PHOSPHATE SODIUM, PYRIDOXINE HYDROCHLORIDE, NIACINAMIDE, DEXPANTHENOL, ALPHA-TOCOPHEROL ACETATE, VITAMIN K1, FOLIC ACID, BIOTIN, CYANOCOBALAMIN 1 MLS/HR: 200; 3300; 200; 6; 3.6; 6; 40; 15; 10; 150; 600; 60; 5 INJECTION, SOLUTION INTRAVENOUS at 04:58

## 2018-03-21 RX ADMIN — POTASSIUM CHLORIDE 1 MLS/HR: 200 INJECTION, SOLUTION INTRAVENOUS at 12:59

## 2018-03-21 RX ADMIN — ASCORBIC ACID, VITAMIN A PALMITATE, CHOLECALCIFEROL, THIAMINE HYDROCHLORIDE, RIBOFLAVIN-5 PHOSPHATE SODIUM, PYRIDOXINE HYDROCHLORIDE, NIACINAMIDE, DEXPANTHENOL, ALPHA-TOCOPHEROL ACETATE, VITAMIN K1, FOLIC ACID, BIOTIN, CYANOCOBALAMIN 1 MLS/HR: 200; 3300; 200; 6; 3.6; 6; 40; 15; 10; 150; 600; 60; 5 INJECTION, SOLUTION INTRAVENOUS at 20:32

## 2018-03-21 RX ADMIN — HEPARIN SODIUM 1 UNIT: 5000 INJECTION, SOLUTION INTRAVENOUS; SUBCUTANEOUS at 05:04

## 2018-03-21 RX ADMIN — POTASSIUM CHLORIDE 1 MLS/HR: 200 INJECTION, SOLUTION INTRAVENOUS at 14:03

## 2018-03-21 RX ADMIN — THERA TABS 1 TAB: TAB at 09:00

## 2018-03-21 RX ADMIN — CLINDAMYCIN IN 5 PERCENT DEXTROSE 1 MLS/HR: 12 INJECTION, SOLUTION INTRAVENOUS at 05:05

## 2018-03-21 RX ADMIN — CLINDAMYCIN IN 5 PERCENT DEXTROSE 1 MLS/HR: 12 INJECTION, SOLUTION INTRAVENOUS at 18:24

## 2018-03-21 RX ADMIN — MORPHINE SULFATE 1 MG: 2 INJECTION, SOLUTION INTRAMUSCULAR; INTRAVENOUS at 19:28

## 2018-03-21 RX ADMIN — FAMOTIDINE 1 MG: 10 INJECTION, SOLUTION INTRAVENOUS at 09:12

## 2018-03-21 RX ADMIN — HEPARIN SODIUM 1 UNIT: 5000 INJECTION, SOLUTION INTRAVENOUS; SUBCUTANEOUS at 14:00

## 2018-03-21 RX ADMIN — CLINDAMYCIN IN 5 PERCENT DEXTROSE 1 MLS/HR: 12 INJECTION, SOLUTION INTRAVENOUS at 22:01

## 2018-03-21 RX ADMIN — AMLODIPINE BESYLATE 1 MG: 10 TABLET ORAL at 09:00

## 2018-03-21 RX ADMIN — ASCORBIC ACID, VITAMIN A PALMITATE, CHOLECALCIFEROL, THIAMINE HYDROCHLORIDE, RIBOFLAVIN-5 PHOSPHATE SODIUM, PYRIDOXINE HYDROCHLORIDE, NIACINAMIDE, DEXPANTHENOL, ALPHA-TOCOPHEROL ACETATE, VITAMIN K1, FOLIC ACID, BIOTIN, CYANOCOBALAMIN 1 MLS/HR: 200; 3300; 200; 6; 3.6; 6; 40; 15; 10; 150; 600; 60; 5 INJECTION, SOLUTION INTRAVENOUS at 01:00

## 2018-03-21 RX ADMIN — CEFAZOLIN SODIUM 1: 2 SOLUTION INTRAVENOUS at 18:52

## 2018-03-21 RX ADMIN — ASCORBIC ACID, VITAMIN A PALMITATE, CHOLECALCIFEROL, THIAMINE HYDROCHLORIDE, RIBOFLAVIN-5 PHOSPHATE SODIUM, PYRIDOXINE HYDROCHLORIDE, NIACINAMIDE, DEXPANTHENOL, ALPHA-TOCOPHEROL ACETATE, VITAMIN K1, FOLIC ACID, BIOTIN, CYANOCOBALAMIN 1 MLS/HR: 200; 3300; 200; 6; 3.6; 6; 40; 15; 10; 150; 600; 60; 5 INJECTION, SOLUTION INTRAVENOUS at 22:02

## 2018-03-21 RX ADMIN — CLOPIDOGREL 1 MG: 75 TABLET, FILM COATED ORAL at 09:00

## 2018-03-21 RX ADMIN — SERTRALINE HYDROCHLORIDE 1 MG: 50 TABLET ORAL at 09:00

## 2018-03-21 RX ADMIN — ATORVASTATIN CALCIUM 1 MG: 40 TABLET, FILM COATED ORAL at 20:48

## 2018-03-21 RX ADMIN — PROPOFOL 1: 10 INJECTION, EMULSION INTRAVENOUS at 18:52

## 2018-03-21 RX ADMIN — LISINOPRIL 1 MG: 20 TABLET ORAL at 20:48

## 2018-03-21 RX ADMIN — FAMOTIDINE 1 MG: 10 INJECTION, SOLUTION INTRAVENOUS at 20:32

## 2018-03-21 RX ADMIN — HYDRALAZINE HYDROCHLORIDE 1 MG: 20 INJECTION INTRAMUSCULAR; INTRAVENOUS at 18:59

## 2018-03-21 RX ADMIN — POTASSIUM CHLORIDE 1 MLS/HR: 200 INJECTION, SOLUTION INTRAVENOUS at 11:47

## 2018-03-21 RX ADMIN — HEPARIN SODIUM 1 UNIT: 5000 INJECTION, SOLUTION INTRAVENOUS; SUBCUTANEOUS at 22:05

## 2018-03-21 RX ADMIN — ASPIRIN 325 MG ORAL TABLET 1 MG: 325 PILL ORAL at 09:00

## 2018-03-22 RX ADMIN — CLOPIDOGREL 1 MG: 75 TABLET, FILM COATED ORAL at 08:44

## 2018-03-22 RX ADMIN — HEPARIN SODIUM 1 UNIT: 5000 INJECTION, SOLUTION INTRAVENOUS; SUBCUTANEOUS at 06:06

## 2018-03-22 RX ADMIN — ASCORBIC ACID, VITAMIN A PALMITATE, CHOLECALCIFEROL, THIAMINE HYDROCHLORIDE, RIBOFLAVIN-5 PHOSPHATE SODIUM, PYRIDOXINE HYDROCHLORIDE, NIACINAMIDE, DEXPANTHENOL, ALPHA-TOCOPHEROL ACETATE, VITAMIN K1, FOLIC ACID, BIOTIN, CYANOCOBALAMIN 1 MLS/HR: 200; 3300; 200; 6; 3.6; 6; 40; 15; 10; 150; 600; 60; 5 INJECTION, SOLUTION INTRAVENOUS at 07:00

## 2018-03-22 RX ADMIN — ASCORBIC ACID, VITAMIN A PALMITATE, CHOLECALCIFEROL, THIAMINE HYDROCHLORIDE, RIBOFLAVIN-5 PHOSPHATE SODIUM, PYRIDOXINE HYDROCHLORIDE, NIACINAMIDE, DEXPANTHENOL, ALPHA-TOCOPHEROL ACETATE, VITAMIN K1, FOLIC ACID, BIOTIN, CYANOCOBALAMIN 1 MLS/HR: 200; 3300; 200; 6; 3.6; 6; 40; 15; 10; 150; 600; 60; 5 INJECTION, SOLUTION INTRAVENOUS at 11:29

## 2018-03-22 RX ADMIN — HEPARIN SODIUM 1 UNIT: 5000 INJECTION, SOLUTION INTRAVENOUS; SUBCUTANEOUS at 13:50

## 2018-03-22 RX ADMIN — ASPIRIN 325 MG ORAL TABLET 1 MG: 325 PILL ORAL at 08:44

## 2018-03-22 RX ADMIN — FAMOTIDINE 1 MG: 10 INJECTION, SOLUTION INTRAVENOUS at 08:44

## 2018-03-22 RX ADMIN — THERA TABS 1 TAB: TAB at 08:44

## 2018-03-22 RX ADMIN — CLINDAMYCIN IN 5 PERCENT DEXTROSE 1 MLS/HR: 12 INJECTION, SOLUTION INTRAVENOUS at 06:05

## 2018-03-22 RX ADMIN — SERTRALINE HYDROCHLORIDE 1 MG: 50 TABLET ORAL at 08:44

## 2018-03-22 RX ADMIN — AMLODIPINE BESYLATE 1 MG: 10 TABLET ORAL at 08:44
